# Patient Record
(demographics unavailable — no encounter records)

---

## 2024-11-05 NOTE — PROCEDURE
[de-identified] : Procedure performed: Fiberoptic Laryngeal Endoscopy - Diagnostic for left neck mass After informed verbal consent topical neosynephrine and lidocaine were applied, the fiberoptic nasal endoscope was passed via the R/L nasal cavity without incident. The patient tolerated the procedure quite well. The nasal cavity was normal without evidence of masses, polyps, lesions or drainage. The nasal septum and turbinates are within normal limits. Nasopharynx was normal without lesions or masses. Eustachian tube orifice normal bilaterals. Oropharynx and Hypopharyngeal mucosa are within normal limits without lesions masses, ulcerations or concerning findings. True and false vocal folds are within normal limits, normal sensory and motor examination. The base of tongue, vallecula, epiglottis are within normal limits without evidence of lesions or abnormalities.  Positive Findings: No sinonasal mass, normal nasopharynx, pharynx, BOT, not obvious primary lesion noted.

## 2024-11-05 NOTE — PHYSICAL EXAM
[Midline] : trachea located in midline position [de-identified] : +6 cm left neck mass, firm and fixed,  extending below level of clavicle, adenopathy noted [Normal] : no rashes

## 2024-11-05 NOTE — PROCEDURE
[de-identified] : Procedure performed: Fiberoptic Laryngeal Endoscopy - Diagnostic for left neck mass After informed verbal consent topical neosynephrine and lidocaine were applied, the fiberoptic nasal endoscope was passed via the R/L nasal cavity without incident. The patient tolerated the procedure quite well. The nasal cavity was normal without evidence of masses, polyps, lesions or drainage. The nasal septum and turbinates are within normal limits. Nasopharynx was normal without lesions or masses. Eustachian tube orifice normal bilaterals. Oropharynx and Hypopharyngeal mucosa are within normal limits without lesions masses, ulcerations or concerning findings. True and false vocal folds are within normal limits, normal sensory and motor examination. The base of tongue, vallecula, epiglottis are within normal limits without evidence of lesions or abnormalities.  Positive Findings: No sinonasal mass, normal nasopharynx, pharynx, BOT, not obvious primary lesion noted.

## 2024-11-05 NOTE — PHYSICAL EXAM
[Midline] : trachea located in midline position [de-identified] : +6 cm left neck mass, firm and fixed,  extending below level of clavicle, adenopathy noted [Normal] : no rashes

## 2024-11-05 NOTE — HISTORY OF PRESENT ILLNESS
[de-identified] : 11/5/24: 36M referred by Dr. Mccallum for left neck mass. Denies any symptoms like dysphagia, difficulty breathing, neck pain, fever, chills, or unintentional weight loss. Lost significant weight on a diet recently and his PCP noticed a left neck fullness. CT scan showed left neck mass 6cm with multiple pathologic nodes in the neck and chest. No thyroid masses or nodules noted. No other masses or lesions noted on the report. He has not had a biopsy as of yet.

## 2024-11-05 NOTE — REVIEW OF SYSTEMS
[Swelling Neck] : swelling neck [Recent Weight Loss (___ Lbs)] : recent [unfilled] ~Ulb weight loss [Negative] : Heme/Lymph [FreeTextEntry2] : Weight loss due to intentional dieting

## 2024-11-05 NOTE — HISTORY OF PRESENT ILLNESS
[de-identified] : 11/5/24: 36M referred by Dr. Mccallum for left neck mass. Denies any symptoms like dysphagia, difficulty breathing, neck pain, fever, chills, or unintentional weight loss. Lost significant weight on a diet recently and his PCP noticed a left neck fullness. CT scan showed left neck mass 6cm with multiple pathologic nodes in the neck and chest. No thyroid masses or nodules noted. No other masses or lesions noted on the report. He has not had a biopsy as of yet.

## 2024-12-13 NOTE — HISTORY OF PRESENT ILLNESS
[de-identified] : 11/5/24: 36M referred by Dr. Mccallum for left neck mass. Denies any symptoms like dysphagia, difficulty breathing, neck pain, fever, chills, or unintentional weight loss. Lost significant weight on a diet recently and his PCP noticed a left neck fullness. CT scan showed left neck mass 6cm with multiple pathologic nodes in the neck and chest. No thyroid masses or nodules noted. No other masses or lesions noted on the report. He has not had a biopsy as of yet. [FreeTextEntry1] : 12/13/24 patient and I had a long discussion via telehealth video conferencing damian today with him and his wife regarding his recent diagnosis of Hodkins lymphoma staging PET scan and we had a good discussion about plan of care

## 2024-12-13 NOTE — REVIEW OF SYSTEMS
[Swelling Neck] : swelling neck [Swollen Glands] : swollen glands [Swollen Glands In The Neck] : swollen glands in the neck [Negative] : Endocrine [FreeTextEntry1] : He is having some night sweats and low-grade fevers. Also had an episode of fainting in the shower.